# Patient Record
Sex: MALE | Race: WHITE | NOT HISPANIC OR LATINO | Employment: FULL TIME | URBAN - METROPOLITAN AREA
[De-identification: names, ages, dates, MRNs, and addresses within clinical notes are randomized per-mention and may not be internally consistent; named-entity substitution may affect disease eponyms.]

---

## 2023-04-27 LAB
ACTIVATED PARTIAL THROMBOPLASTIN TIME IN PPP BY COAGULATION ASSAY: 34 SEC (ref 26–39)
ALANINE AMINOTRANSFERASE (SGPT) (U/L) IN SER/PLAS: 20 U/L (ref 10–52)
ALBUMIN (G/DL) IN SER/PLAS: 4.7 G/DL (ref 3.4–5)
ALKALINE PHOSPHATASE (U/L) IN SER/PLAS: 55 U/L (ref 33–120)
ANION GAP IN SER/PLAS: 12 MMOL/L (ref 10–20)
APPEARANCE, URINE: CLEAR
ASPARTATE AMINOTRANSFERASE (SGOT) (U/L) IN SER/PLAS: 21 U/L (ref 9–39)
BASOPHILS (10*3/UL) IN BLOOD BY AUTOMATED COUNT: 0.06 X10E9/L (ref 0–0.1)
BASOPHILS/100 LEUKOCYTES IN BLOOD BY AUTOMATED COUNT: 1.3 % (ref 0–2)
BILIRUBIN TOTAL (MG/DL) IN SER/PLAS: 0.8 MG/DL (ref 0–1.2)
BILIRUBIN, URINE: NEGATIVE
BLOOD, URINE: NEGATIVE
CALCIUM (MG/DL) IN SER/PLAS: 10.1 MG/DL (ref 8.6–10.3)
CARBON DIOXIDE, TOTAL (MMOL/L) IN SER/PLAS: 31 MMOL/L (ref 21–32)
CHLORIDE (MMOL/L) IN SER/PLAS: 101 MMOL/L (ref 98–107)
COLOR, URINE: YELLOW
CREATININE (MG/DL) IN SER/PLAS: 1.04 MG/DL (ref 0.5–1.3)
EOSINOPHILS (10*3/UL) IN BLOOD BY AUTOMATED COUNT: 0.19 X10E9/L (ref 0–0.7)
EOSINOPHILS/100 LEUKOCYTES IN BLOOD BY AUTOMATED COUNT: 4.2 % (ref 0–6)
ERYTHROCYTE DISTRIBUTION WIDTH (RATIO) BY AUTOMATED COUNT: 12.8 % (ref 11.5–14.5)
ERYTHROCYTE MEAN CORPUSCULAR HEMOGLOBIN CONCENTRATION (G/DL) BY AUTOMATED: 33.1 G/DL (ref 32–36)
ERYTHROCYTE MEAN CORPUSCULAR VOLUME (FL) BY AUTOMATED COUNT: 91 FL (ref 80–100)
ERYTHROCYTES (10*6/UL) IN BLOOD BY AUTOMATED COUNT: 5.27 X10E12/L (ref 4.5–5.9)
GFR MALE: >90 ML/MIN/1.73M2
GLUCOSE (MG/DL) IN SER/PLAS: 64 MG/DL (ref 74–99)
GLUCOSE, URINE: NEGATIVE MG/DL
HEMATOCRIT (%) IN BLOOD BY AUTOMATED COUNT: 47.7 % (ref 41–52)
HEMOGLOBIN (G/DL) IN BLOOD: 15.8 G/DL (ref 13.5–17.5)
IMMATURE GRANULOCYTES/100 LEUKOCYTES IN BLOOD BY AUTOMATED COUNT: 0.2 % (ref 0–0.9)
INR IN PPP BY COAGULATION ASSAY: 1.1 (ref 0.9–1.1)
KETONES, URINE: NEGATIVE MG/DL
LEUKOCYTE ESTERASE, URINE: NEGATIVE
LEUKOCYTES (10*3/UL) IN BLOOD BY AUTOMATED COUNT: 4.6 X10E9/L (ref 4.4–11.3)
LYMPHOCYTES (10*3/UL) IN BLOOD BY AUTOMATED COUNT: 1.87 X10E9/L (ref 1.2–4.8)
LYMPHOCYTES/100 LEUKOCYTES IN BLOOD BY AUTOMATED COUNT: 41.1 % (ref 13–44)
MONOCYTES (10*3/UL) IN BLOOD BY AUTOMATED COUNT: 0.45 X10E9/L (ref 0.1–1)
MONOCYTES/100 LEUKOCYTES IN BLOOD BY AUTOMATED COUNT: 9.9 % (ref 2–10)
NEUTROPHILS (10*3/UL) IN BLOOD BY AUTOMATED COUNT: 1.97 X10E9/L (ref 1.2–7.7)
NEUTROPHILS/100 LEUKOCYTES IN BLOOD BY AUTOMATED COUNT: 43.3 % (ref 40–80)
NITRITE, URINE: NEGATIVE
PH, URINE: 5 (ref 5–8)
PLATELETS (10*3/UL) IN BLOOD AUTOMATED COUNT: 258 X10E9/L (ref 150–450)
POTASSIUM (MMOL/L) IN SER/PLAS: 4.1 MMOL/L (ref 3.5–5.3)
PROTEIN TOTAL: 7.7 G/DL (ref 6.4–8.2)
PROTEIN, URINE: NEGATIVE MG/DL
PROTHROMBIN TIME (PT) IN PPP BY COAGULATION ASSAY: 12.8 SEC (ref 9.8–13.4)
SODIUM (MMOL/L) IN SER/PLAS: 140 MMOL/L (ref 136–145)
SPECIFIC GRAVITY, URINE: 1.02 (ref 1–1.03)
UREA NITROGEN (MG/DL) IN SER/PLAS: 16 MG/DL (ref 6–23)
UROBILINOGEN, URINE: <2 MG/DL (ref 0–1.9)

## 2023-04-28 LAB — URINE CULTURE: NO GROWTH

## 2023-05-16 ENCOUNTER — HOSPITAL ENCOUNTER (OUTPATIENT)
Dept: DATA CONVERSION | Facility: HOSPITAL | Age: 38
End: 2023-05-16
Attending: ORTHOPAEDIC SURGERY | Admitting: ORTHOPAEDIC SURGERY
Payer: COMMERCIAL

## 2023-05-16 DIAGNOSIS — Z88.2 ALLERGY STATUS TO SULFONAMIDES: ICD-10-CM

## 2023-05-16 DIAGNOSIS — S83.512A SPRAIN OF ANTERIOR CRUCIATE LIGAMENT OF LEFT KNEE, INITIAL ENCOUNTER: ICD-10-CM

## 2023-05-16 DIAGNOSIS — S83.282A OTHER TEAR OF LATERAL MENISCUS, CURRENT INJURY, LEFT KNEE, INITIAL ENCOUNTER: ICD-10-CM

## 2023-09-14 NOTE — H&P
History of Present Illness:   Admission Reason: Left knee pain swelling and instability   HPI:    PAVEL FLOR is a 37 year old Male  Patient seen and evaluated for left knee pain swelling and instability    HPI patient injured left knee while playing sports in March 2023.  He had pain and swelling and instability diagnostic studies show ACL tear and lateral meniscal tear.  After risk benefits alternatives were discussed patient elects to proceed with left  knee arthroscopy.  Surgery be performed 5/16/2023 at [facility].    Past medical hx none    past surgical hx dental reports he had a long time to wake up    Social hx denies substance abuse denies alcohol or tobacco use    family hx heart disease    ROS noncontributory    PE  head normocephalic atraumatic  heart regular rate rhythm no rubs gallops appreciated  lungs clear to auscultation bilaterally  abd soft nontender no active bowel sounds x4 quadrants  extremity left knee positive effusion positive Lachman current range of motion is 0 to 150 degrees    impression left knee ACL tear and lateral meniscal tear    plan left knee arthroscopy partial lateral meniscectomy and ACL reconstruction      Patient's past medical history, allergies, surgical history, review of systems, family history, social history, medication list are all reviewed and documented on the chart.               Allergies:  ·  sulfa drugs : Hives/Urticaria    Medications Prior to Admission:   The patient does not take any medications at home.      Electronic Signatures:  Edu Morris (PAC)  (Signed 10-May-2023 11:30)   Authored: History of Present Illness, Comorbidities,  Allergies, Medications Prior to Admission, Objective, Note Completion      Last Updated: 10-May-2023 11:30 by Edu Morris (JONNIE)

## 2023-09-30 NOTE — H&P
History & Physical Reviewed:   I have reviewed the History and Physical dated:  10-May-2023   History and Physical reviewed and relevant findings noted. Patient examined to review pertinent physical  findings.: No significant changes   Home Medications Reviewed: no changes noted   Allergies Reviewed: no changes noted     Consent:   COVID-19 Consent:  ·  COVID-19 Risk Consent Surgeon has reviewed key risks related to the risk of ridge COVID-19 and if they contract COVID-19 what the risks are.       Electronic Signatures:  Edu Flanagan)  (Signed 16-May-2023 06:57)   Authored: History & Physical Reviewed, Consent, Note  Completion      Last Updated: 16-May-2023 06:57 by Edu Flanagan)

## 2023-10-02 NOTE — OP NOTE
Post Operative Note:     PreOp Diagnosis: Left knee ACL tear and lateral meniscus  tear   Post-Procedure Diagnosis: Left knee ACL tear and  lateral meniscus tear   Procedure: 1.  Left knee arthroscopic ACL reconstruction  with autogenous hamstring tendons  2.  Left knee arthroscopic partial lateral meniscectomy  3.  Left knee open harvest of autogenous hamstring tendons  4.   5.   Surgeon: Edu Flanagan M.D.   Resident/Fellow/Other Assistant: Edu Morris PA-C   Anesthesia: General with a regional block   Estimated Blood Loss (mL): none   Specimen: no   Findings: Left knee ACL tear and lateral meniscus  tear   Additional Details: Tmi Morris physician assistant  (PAC) was required and present throughout the entire case.  Given the nature of the disease process and the procedure, a skilled surgical first assistant was necessary during the entire case.  The assistant was necessary to hold retractors and manipulate extremity during the procedure.  A certified scrub tech  was also present at the back table managing instruments with supplies for the surgical case     Operative Report Dictated:  Dictation: not applicable - note contains Operative  Report   Note Recipients: Edu Flanagan MD - [id number]  [Preferred]   Operative Report:    Risk and benefits of surgery discussed extensively with the patient.Surgical risk included but were not limited to infection, wear, loosening, need for further surgery,  failure of the meniscus repair to heal , blood clot, failure to heal, failure of the surgery, stiffness, loss of limb life, extremity function change in length change, and associated risks of surgery during the coronavirus epidemic.    Patient was brought to operating room for that [facility] and induction of  anesthesia.  Risk and benefits of surgery discussed extensively with the patient.    Surgical risk included but were not limited to infection, wear, loosening, need for further surgery blood  clot, failure to heal, failure of the surgery, stiffness, loss of limb life, extremity function change in length change, and associated risks of  surgery during the coronavirus epidemic.  Exam under anesthesia shows a 3-4+ Lachman's and pivot shift.  There was no posterior rotatory instability.  The dial test was negative.  Collateral integrity was noted.    At this time routine prep and drape was completed and hamstring harvest was initiated.  Through a small 2 cm incision in line with the tibia just medial to the tibial tubercle we identified the pes anserinus tendons after incising skin and blunt dissection.    At this time we identified the semitendinosis and gracilis tendon attaching a #2 FiberWire to the end of the tendons after they were released and and using blunt tendon dissection we easily harvested the semitendinosis and gracilis tendon.   The tendons  were taken to the back table they were cleaned and prepared into a quadruple strand graft.  #2 FiberWire's were placed at both ends and the sutures in a quadruple standard manner were calibrated to fit through the appropriate tunnel of approximately 9  mm.          At this time the arthroscopic portion of the case was begun.   Esmarch exsanguination of the knee was completed tourniquet was inflated and we began arthroscopy.   Standard portals were established around the patellar tendon and we sequentially evaluated  the knee.   Patellofemoral joint was clean and the  medial and  lateral gutters were clean.        The medial joint space  was relatively pristine..     The medial meniscus was intact.  There was no chondral damage or injury      THe lateral joint space was   relatively pristine.   The latera meniscus was torn at the anterior horn.  This involved the inner 10 to 15% only of the anterior horn of the lateral meniscus as pointed out on the preoperative MRI.  A partial lateral meniscectomy  comprising of the innermost fibers of the anterior lateral  horn was completed.  A transition zone was created directly laterally.  The posterior horn of the lateral meniscus was intact.  There was no chondral damage or other injury noted to the weightbearing  surfaces              In the notch position the ACL was completely torn off its femoral attachment.  We debrided and cleaned the ACL and also completed a small notchplasty to allow the over-the-top position to be inspected and evaluated.         Using an ACL tibial guide system we placed the center portion in the central portion of the old ACL footprint intra-articular and using the same skin incision as the hamstring harvest but a separate fascial plane a guidepin was inserted into the old  ACL footprint from the tibial incision.        This tunnel was opened with an 9 mm reamer.Once the tunnel was opened it was smoothed and cleaned.  A 7 mm over-the-top guide was then used to place the femoral tunnel at approximately the 2 o'clock position to prevent a vertical graft.    A  Guidepin was inserted  into the over the top drill guide and a 9 mm acorn reamer was inserted to 35 to 40 mm.   Once this tunnel was cleaned smooth the outrigger system from [employer] was used to establish a guidepin that was inserted from lateral to  medial across the transcondylar axis of the femur.    The outrigger system was removed.  The lateral cortex was opened.  In the wire was exchanged for a flexible wire.  Using the outrigger guide arm we withdrew the flexible wire through the knee joint and out the tibial side.    The graft was placed over the wire and easily recessed to approximately 25 to 30 mm.A bio composite cross pin was then inserted from lateral to medial secured to the femoral side of the ACL graft.       At this time the arthroscope was reinserted into the knee and under direct observation we tensioned the graft in situ taking it through 30 ranges of motion at 35 pounds of pressure and at approximately 30 degrees of flexion we  secured the graft on the  tibial side with a 10   mm bio composite interference crew with excellent purchase.         The residual ends of the graft were cut.  Final inspection showed a negative Lachman's negative pivot shift the graft is well-positioned without any impingement and it was photodocumentation.          At this time the knee was drained of all material portal sites were closed Marcaine and Duramorph was inserted into the incisions for comfort and a compressive dressing was applied.  Tourniquet was deflated.  Knee immobilizer applied.  Anesthesia was  reversed and patient was transferred to recovery.      Electronic Signatures:  Edu Flanagan)  (Signed 16-May-2023 10:05)   Authored: Post Operative Note, Note Completion      Last Updated: 16-May-2023 10:05 by Edu Flanagan)

## 2023-10-25 PROBLEM — S83.512A SPRAIN OF ANTERIOR CRUCIATE LIGAMENT OF LEFT KNEE: Status: ACTIVE | Noted: 2023-10-25

## 2023-10-25 PROBLEM — S83.232A COMPLEX TEAR OF MEDIAL MENISCUS OF LEFT KNEE: Status: ACTIVE | Noted: 2023-10-25

## 2023-10-25 PROBLEM — S83.519A ACL TEAR: Status: ACTIVE | Noted: 2023-10-25

## 2023-10-25 PROBLEM — S83.249A ACUTE MEDIAL MENISCUS TEAR: Status: ACTIVE | Noted: 2023-10-25

## 2023-10-26 ENCOUNTER — ANCILLARY PROCEDURE (OUTPATIENT)
Dept: RADIOLOGY | Facility: CLINIC | Age: 38
End: 2023-10-26
Payer: COMMERCIAL

## 2023-10-26 ENCOUNTER — OFFICE VISIT (OUTPATIENT)
Dept: ORTHOPEDIC SURGERY | Facility: CLINIC | Age: 38
End: 2023-10-26
Payer: COMMERCIAL

## 2023-10-26 DIAGNOSIS — S83.232D COMPLEX TEAR OF MEDIAL MENISCUS OF LEFT KNEE AS CURRENT INJURY, SUBSEQUENT ENCOUNTER: ICD-10-CM

## 2023-10-26 DIAGNOSIS — Z98.890 HISTORY OF RECONSTRUCTION OF ANTERIOR CRUCIATE LIGAMENT TEAR: Primary | ICD-10-CM

## 2023-10-26 PROCEDURE — 99213 OFFICE O/P EST LOW 20 MIN: CPT | Performed by: ORTHOPAEDIC SURGERY

## 2023-10-26 PROCEDURE — 1036F TOBACCO NON-USER: CPT | Performed by: ORTHOPAEDIC SURGERY

## 2023-10-26 PROCEDURE — 73560 X-RAY EXAM OF KNEE 1 OR 2: CPT | Mod: LT

## 2023-10-26 PROCEDURE — 73560 X-RAY EXAM OF KNEE 1 OR 2: CPT | Mod: LEFT SIDE | Performed by: ORTHOPAEDIC SURGERY

## 2023-10-26 NOTE — PROGRESS NOTES
History of present illness: Left ACL reconstruction May 2023 doing otherwise quite well    Physical exam:    General: No acute distress or breathing difficulty or discomfort, pleasant and cooperative with the examination.    Extremities: Full extension negative Marcelo negative Lachman's negative pivot shift he has flexion to 170 no pain no catching no popping no mechanical symptoms neurovascular intact no redness no warmth no effusion compartments are all soft ecchymosis bruising is fully resolved    Diagnostic studies: 2 view x-rays show a well-healed left knee ACL reconstruction with biologic ingrowth of his interference screws and cross pin in the tibia and femur    Impression: ACL reconstruction doing well    Plan: Resume slowly to full sports and activities he is 6 months out so he should avoid aggressive twisting pivoting injury activities for the next couple of months if he did ski or do anything this winter he should wear his ACL brace we will see him back in our office as needed